# Patient Record
Sex: MALE | Race: WHITE | NOT HISPANIC OR LATINO | Employment: OTHER | URBAN - METROPOLITAN AREA
[De-identification: names, ages, dates, MRNs, and addresses within clinical notes are randomized per-mention and may not be internally consistent; named-entity substitution may affect disease eponyms.]

---

## 2023-10-12 NOTE — PROGRESS NOTES
Subjective   Patient ID: Tung Cook is a 78 y.o. male who presents for 6 month follow up sinus.  HPI    Review of Systems   HENT: Negative.       Objective   Physical Exam  Examination:    CONSTITUTIONAL: Alert, in no acute distress, normal pitch/clarity of voice, well-developed, well-nourished, cooperative.  HEAD/FACE: Normocephalic, atraumatic, no tenderness over the sinuses, facial strength and movement symmetric.    SKIN: Good turgor, no rashes, no suspicious lesions, in the head and neck.    EYES: Both eyes have normal extraocular movements with no nystagmus, pupils are equal and reactive to light and accommodation, conjunctiva is clear.    EARS: Both ears are negative for external skin abnormalities, external auditory canals are without lesions or signs of inflammation, tympanic membranes are intact and are of normal color and texture, no effusions are seen, light reflexes normal, no mastoid tenderness is noted to palpation, objective hearing is intact.    NOSE: No external skin lesions are noted, nares are patent, septum is intact and noninflamed, nasal turbinates are normal in appearance, sinuses are nontender to palpation bilaterally, no internal lesions or polyps are noted, no discharge is noted.    OROPHARYNX/ORAL CAVITY: Mucous membranes of the oropharynx and the oral cavity proper are without lesions or ulcerations, tongue mobility is normal and no lesions are noted, gingiva and alveolar mucosa is intact without lesions, oral mucosa is moist, muscular movement of the palate and gag reflex are normal.    NASOPHARYNX: Mucous membranes are noninflamed and no secretions or lesions are noted.    LARYNX: No mucosal inflammation or exudates are noted, arytenoids are normal in appearance and mobility, false vocal cords are without lesions as is the remainder of the supraglottic larynx, true vocal folds are mobile without inflammation or obstructions and no masses or lesions are noted in the  endolarynx.    NECK: No lymphadenopathy is palpated, neck is supple with full range of motion, thyroid is without swelling or tenderness, trachea is midline, no neck masses are noted.    Lymphatics: No cervical adenopathy or supraclavicular adenopathy noted to palpation.    HEART/VASCULAR: No jugular venous distention is noted, carotid pulsations are intact with a regular rate and rhythm noted,    PULMONARY: Good air movement with normal inspiratory/expiratory effort is noted, no audible wheezing is appreciated.    NEUROLOGIC: Alert and oriented, cranial nerves are grossly intact, gait is normal, sensation in the head and neck is intact,    PSYCH: oriented to person, place and time, normal mood and affect.    EXTREMITIES: No motor dysfunction of the upper and lower extremity is noted.     Assessment/Plan   {Assess/PlanSmartLinks:82317}

## 2023-10-16 ENCOUNTER — APPOINTMENT (OUTPATIENT)
Dept: OTOLARYNGOLOGY | Facility: CLINIC | Age: 78
End: 2023-10-16
Payer: MEDICARE

## 2023-11-03 PROBLEM — J34.89 SINUS MUCOSAL THICKENING: Status: ACTIVE | Noted: 2023-11-03

## 2023-11-03 PROBLEM — H52.13 MYOPIA, BILATERAL: Status: ACTIVE | Noted: 2023-04-24

## 2023-11-03 PROBLEM — G43.909 MIGRAINE: Status: ACTIVE | Noted: 2020-01-13

## 2023-11-03 PROBLEM — H25.13 NUCLEAR SENILE CATARACT OF BOTH EYES: Status: ACTIVE | Noted: 2019-07-10

## 2023-11-03 PROBLEM — J30.89 ENVIRONMENTAL AND SEASONAL ALLERGIES: Status: ACTIVE | Noted: 2019-07-10

## 2023-11-03 PROBLEM — Z86.79 HISTORY OF HYPERTENSION: Status: ACTIVE | Noted: 2023-11-03

## 2023-11-03 PROBLEM — H35.033 HYPERTENSIVE RETINOPATHY, BILATERAL: Status: ACTIVE | Noted: 2019-07-10

## 2023-11-03 PROBLEM — H43.813 POSTERIOR VITREOUS DETACHMENT OF BOTH EYES: Status: ACTIVE | Noted: 2019-07-10

## 2023-11-03 PROBLEM — J34.3 NASAL TURBINATE HYPERTROPHY: Status: ACTIVE | Noted: 2023-11-03

## 2023-11-03 PROBLEM — M19.90 ARTHRITIS: Chronic | Status: ACTIVE | Noted: 2019-09-18

## 2023-11-03 PROBLEM — K40.90 RIGHT INGUINAL HERNIA: Status: ACTIVE | Noted: 2018-02-22

## 2023-11-03 PROBLEM — I10 ESSENTIAL HYPERTENSION: Chronic | Status: ACTIVE | Noted: 2018-10-05

## 2023-11-03 PROBLEM — J34.9 DISORDER OF PARANASAL SINUS: Status: ACTIVE | Noted: 2023-11-03

## 2023-11-03 PROBLEM — J34.89 NASAL VESTIBULITIS: Status: ACTIVE | Noted: 2023-11-03

## 2023-11-03 PROBLEM — M35.9 DIFFUSE CONNECTIVE TISSUE DISEASE (MULTI): Status: ACTIVE | Noted: 2023-06-23

## 2023-11-03 PROBLEM — H93.293 ABNORMAL AUDITORY PERCEPTION OF BOTH EARS: Status: ACTIVE | Noted: 2023-11-03

## 2023-11-03 RX ORDER — PYRIDOXINE HCL (VITAMIN B6) 100 MG
250 TABLET ORAL
COMMUNITY

## 2023-11-03 RX ORDER — LOSARTAN POTASSIUM 50 MG/1
50 TABLET ORAL
COMMUNITY
Start: 2019-05-09

## 2023-11-03 RX ORDER — MULTIVIT-MIN/IRON/FOLIC ACID/K 18-600-40
CAPSULE ORAL
COMMUNITY
Start: 2023-01-23 | End: 2024-01-29 | Stop reason: ALTCHOICE

## 2023-11-03 RX ORDER — ACETAMINOPHEN 500 MG
500 TABLET ORAL
COMMUNITY

## 2023-11-03 RX ORDER — HYDROXYCHLOROQUINE SULFATE 200 MG/1
TABLET, FILM COATED ORAL
COMMUNITY
Start: 2019-03-12

## 2023-11-03 RX ORDER — CHOLECALCIFEROL (VITAMIN D3) 25 MCG
TABLET ORAL
COMMUNITY
Start: 2023-01-23

## 2023-11-03 RX ORDER — TRIAMCINOLONE ACETONIDE 55 UG/1
SPRAY, METERED NASAL
COMMUNITY
Start: 2019-09-11

## 2023-11-03 RX ORDER — MONTELUKAST SODIUM 10 MG/1
TABLET ORAL
COMMUNITY
Start: 2019-04-18 | End: 2024-01-29 | Stop reason: ALTCHOICE

## 2023-11-03 RX ORDER — ASPIRIN 325 MG
325 TABLET ORAL
COMMUNITY
End: 2024-01-29 | Stop reason: ALTCHOICE

## 2023-11-03 RX ORDER — LORATADINE AND PSEUDOEPHEDRINE SULFATE 5; 120 MG/1; MG/1
TABLET, EXTENDED RELEASE ORAL
COMMUNITY
Start: 2019-05-09 | End: 2024-01-29 | Stop reason: ALTCHOICE

## 2023-11-03 RX ORDER — TIZANIDINE 4 MG/1
4 TABLET ORAL EVERY 8 HOURS PRN
COMMUNITY
Start: 2021-03-08 | End: 2024-01-29 | Stop reason: ALTCHOICE

## 2023-11-03 RX ORDER — HYDROCHLOROTHIAZIDE 25 MG/1
TABLET ORAL
COMMUNITY
Start: 2019-05-09 | End: 2024-01-29 | Stop reason: ALTCHOICE

## 2023-11-03 RX ORDER — PANTOPRAZOLE SODIUM 40 MG/1
40 TABLET, DELAYED RELEASE ORAL
COMMUNITY
Start: 2023-06-23

## 2023-11-03 RX ORDER — CELECOXIB 200 MG/1
200 CAPSULE ORAL
COMMUNITY
Start: 2023-07-28

## 2023-11-05 NOTE — PROGRESS NOTES
Subjective   Patient ID: Tung Cook is a 78 y.o. male who presents for No chief complaint on file..  HPI  This 78-year-old male is being seen back in the office for recheck of his head and neck.  He does have a history of environmental and seasonal allergies as well as multiple food allergies.  Past exams have also shown evidence for nasal turbinate hypertrophy and he has had some past findings regarding sinus mucosal thickening on radiographic studies.  He was previously treated for nasal vestibulitis with mupirocin ointment.He was seen recently for left chest pain and he was sent for CT scan of the chest.  Which did reveal evidence for severe coronary artery atherosclerosis with calcifications.  No consolidation or significant pulmonary masses were noted.  Review of Systems    Objective   Physical Exam  EXAMINATION:    GENERAL DEBORA.EARANCE: Alert, in no acute distress, normal pitch and clarity of voice, well-developed and nourished, cooperative.    HEAD/FACE: Normocephalic, atraumatic, normal facial movements and strength, no no tenderness to palpation, no lesions noted.    SKIN: Normal turgor, no raised or ulcerative lesions, warm and dry to palpation.    EYES: Extraocular motions intact, no nystagmus noted, pupils equal and reactive to light and accommodation, no conjunctivitis.    EARS: Both ears--external ear anatomy is normal without lesions, auditory canals are patent and without skin abrasions or lesions, hearing is intact to voice, tympanic membranes are intact with no acute inflammation, light reflexes present, no effusions are noted and no mastoid tenderness found to palpation.    NOSE: No external skin lesions are noted, nares are patent, septum is intact, sinuses are nontender to palpation bilaterally, no intranasal lesions or inflammation is noted, nasal valve is normal.    OROPHARYNX/ORAL CAVITY: Oropharynx is not inflamed and is without lesions, mucosa of the oral cavity is intact and without  lesions, tongue is midline and mobile, no acute dental disease is noted, TMJs are mobile    LUNG-- NO wheezing or rhonchi normal respiratory effort    HEART-- No venous congestion,  rate and rhythm regular,    NECK: No lymphadenopathy is palpated, carotid pulses are intact, neck is supple with full range of motion, no thyroid abnormalities are noted, trachea is midline, no neck masses are palpated.    LYMPHATICS: No cervical adenopathy or supraclavicular adenopathy is palpated.    NEUROLOGIC/PSYCH; alert and oriented, cranial nerves are grossly intact, gait is without falling, no motor deficits are noted.  Assessment/Plan   {Assess/PlanSmartLinks:[id number]}

## 2023-11-08 ENCOUNTER — APPOINTMENT (OUTPATIENT)
Dept: OTOLARYNGOLOGY | Facility: CLINIC | Age: 78
End: 2023-11-08
Payer: MEDICARE

## 2023-12-30 NOTE — PROGRESS NOTES
Subjective   Patient ID: Tung Cook is a 78 y.o. male who presents for No chief complaint on file..  HPI  This 78-year-old is being seen back in the office for recheck primarily of his upper respiratory tract.  He does have a history of allergies and some structural changes intranasally.  There have been past problems with nasal vestibulitis and mupirocin ointment had been used as treatment for that skin irritation.  Past visits were negative for difficulties with breathing and he had been advised to use saline as a nasal rinse to counteract congestion and postnasal discharge.  He was also advised to stay on a topical nasal steroid spray.  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  Also been visual difficulties.   Review of Systems    Objective   Physical Exam  Examination:     CONSTITUTIONAL: Alert, in no acute distress, normal pitch/clarity of voice, well-developed, well-nourished, cooperative.  HEAD/FACE: Normocephalic, atraumatic, no tenderness over the sinuses, facial strength and movement symmetric.     SKIN: Good turgor, no rashes, no suspicious lesions, in the head and neck.     EYES: Both eyes have normal extraocular movements with no nystagmus, pupils are equal and reactive to light and accommodation, conjunctiva is clear.     EARS: Both ears are negative for external skin abnormalities, external auditory canals are without lesions or signs of inflammation, partially obstructive cerumen is removed from the right ear with a wax loop, tympanic membranes are intact and are of normal color and texture, no effusions are seen, light reflexes normal, no mastoid tenderness is noted to palpation, objective hearing is intact.     NOSE: No external skin lesions are noted, nares are patent, the previous crusting noted on the nasal vestibular area and anterior septum is resolved on the right and there was only some mild dryness noted on the left. No crusting or  signs of acute inflammation was noted.. No purulent discharge was noted. Septum is intact and noninflamed, nasal turbinates are normal in appearance, sinuses are nontender to palpation bilaterally, no internal lesions or polyps are noted, no discharge is noted.     OROPHARYNX/ORAL CAVITY: Mucous membranes of the oropharynx and the oral cavity proper are without lesions or ulcerations, tongue mobility is normal and no lesions are noted, gingiva and alveolar mucosa is intact without lesions, oral mucosa is moist, muscular movement of the palate and gag reflex are normal.     NECK: No lymphadenopathy is palpated, neck is supple with full range of motion, thyroid is without swelling or tenderness, trachea is midline, no neck masses are noted.     Lymphatics: No cervical adenopathy or supraclavicular adenopathy noted to palpation.     HEART/VASCULAR: No jugular venous distention is noted, carotid pulsations are intact with a regular rate and rhythm noted,     PULMONARY: Good air movement with normal inspiratory/expiratory effort is noted, no audible wheezing is appreciated.     NEUROLOGIC: Alert and oriented, cranial nerves are grossly intact, gait is normal, sensation in the head and neck is intact,     PSYCH: oriented to person, place and time, normal mood and affect.     EXTREMITIES: No motor dysfunction of the upper and lower extremity is noted.  Assessment/Plan   Problem List Items Addressed This Visit             ICD-10-CM    Diffuse connective tissue disease (CMS/HCC) M35.9    Environmental and seasonal allergies J30.89    Nasal turbinate hypertrophy - Primary J34.3            Derrell James DMD, MD 12/30/23 1:51 PM

## 2024-01-02 ENCOUNTER — APPOINTMENT (OUTPATIENT)
Dept: OTOLARYNGOLOGY | Facility: CLINIC | Age: 79
End: 2024-01-02
Payer: MEDICARE

## 2024-01-17 RX ORDER — SULFASALAZINE 500 MG/1
TABLET, DELAYED RELEASE ORAL
COMMUNITY
Start: 2023-06-28 | End: 2024-01-29 | Stop reason: ALTCHOICE

## 2024-01-17 RX ORDER — MUPIROCIN 20 MG/G
OINTMENT TOPICAL
COMMUNITY
Start: 2023-01-23 | End: 2024-01-29 | Stop reason: ALTCHOICE

## 2024-01-25 NOTE — PROGRESS NOTES
Objective   Physical Exam  .Subjective   Patient ID: Tung Cook is a 78 y.o. male who presents for Follow-up on nasal congestion and rhinitis primarily  HPI  This 78-year-old is being seen back in the office for recheck primarily of his upper respiratory tract.  He was last seen in March 2023.  At that time an upper endoscopy was done showing no obstruction nasally or signs of infection.  He does have a history of allergies and some structural changes intranasally.  There have been past problems with nasal vestibulitis and mupirocin ointment had been used as treatment for that skin irritation.  Past visits were negative for difficulties with breathing and he had been advised to use saline as a nasal rinse to counteract congestion and postnasal discharge.  He was also advised to stay on a topical nasal steroid spray.  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  Also been visual difficulties.   Review of Systems  A 12 point ROS has been reviewed and are negative for complaint except what is stated in the history of present illness and/or past medical history as noted in the EMR  Objective   Physical Exam  Examination:     CONSTITUTIONAL: Alert, in no acute distress, normal pitch/clarity of voice, well-developed, well-nourished, cooperative.  HEAD/FACE: Normocephalic, atraumatic, no tenderness over the sinuses, facial strength and movement symmetric.     SKIN: Good turgor, no rashes, no suspicious lesions, in the head and neck.     EYES: Both eyes have normal extraocular movements with no nystagmus, pupils are equal and reactive to light and accommodation, conjunctiva is clear.     EARS: Both ears are negative for external skin abnormalities, external auditory canals are without lesions or signs of inflammation, partially obstructive cerumen is removed from the right ear with a wax loop, tympanic membranes are intact and are of normal color and texture, no  effusions are seen, light reflexes normal, no mastoid tenderness is noted to palpation, objective hearing is intact.     NOSE: No external skin lesions are noted, nares are patent, the previous crusting noted on the nasal vestibular area and anterior septum is resolved on the right and there was only some mild dryness noted on the left anterior inferior turbinate.  No crusting or signs of acute inflammation was noted.. No purulent discharge was noted. Septum is intact and noninflamed, nasal turbinates are normal in appearance, sinuses are nontender to palpation bilaterally, no internal lesions or polyps are noted, no discharge is noted.     OROPHARYNX/ORAL CAVITY: Mucous membranes of the oropharynx and the oral cavity proper are without lesions or ulcerations, tongue mobility is normal and no lesions are noted, gingiva and alveolar mucosa is intact without lesions, oral mucosa is moist, muscular movement of the palate and gag reflex are normal.     NECK: No lymphadenopathy is palpated, neck is supple with full range of motion, thyroid is without swelling or tenderness, trachea is midline, no neck masses are noted.     Lymphatics: No cervical adenopathy or supraclavicular adenopathy noted to palpation.     HEART/VASCULAR: No jugular venous distention is noted, carotid pulsations are intact with a regular rate and rhythm noted,     PULMONARY: Good air movement with normal inspiratory/expiratory effort is noted, no audible wheezing is appreciated.     NEUROLOGIC: Alert and oriented, cranial nerves are grossly intact, gait is normal, sensation in the head and neck is intact,     PSYCH: oriented to person, place and time, normal mood and affect.     EXTREMITIES: No motor dysfunction of the upper and lower extremity is noted    Patient ID: Tung Cook is a 78 y.o. male.    Ear cerumen removal    Date/Time: 1/29/2024 1:51 PM    Performed by: Derrell James DMD, MD  Authorized by: Derrell James DMD, MD     Consent:     Consent obtained:  Verbal    Consent given by:  Patient    Risks discussed:  Pain and incomplete removal    Alternatives discussed:  No treatment and alternative treatment  Universal protocol:     Procedure explained and questions answered to patient or proxy's satisfaction: yes      Imaging studies available: no      Required blood products, implants, devices, and special equipment available: no      Patient identity confirmed:  Verbally with patient  Procedure details:     Location:  L ear and R ear    Procedure type: curette      Procedure type comment:  Or suction    Procedure outcomes: cerumen removed    Post-procedure details:     Inspection:  No bleeding and ear canal clear    Hearing quality:  Improved    Procedure completion:  Tolerated well, no immediate complications  Comments:        Procedure Ear Cleaning    Consent:  The planned procedure including the risks as well as alternatives of treatments were discussed and verbal consent obtained.    Procedure: Using otoscopic techniques, cerumen is removed with a wax loop from both ear canals.    Findings:  The external auditory canals are without inflammation or lesions and both tympanic members are normal in appearance with no evidence for middle ear effusion or signs of infection. No mastoid tenderness is noted. The patient tolerated the procedure well.    Assessment/Plan   Problem List Items Addressed This Visit             ICD-10-CM    Diffuse connective tissue disease (CMS/HCC) M35.9    Environmental and seasonal allergies J30.89    Nasal turbinate hypertrophy J34.3    Sinus mucosal thickening - Primary J34.89     Other Visit Diagnoses         Codes    Impacted cerumen of right ear     H61.21          I discussed the clinical finds with the patient.  I reminded him he should try to use the nasal saline washes more frequently even when he is not feeling obstructed nasally.  This will help mucous membrane health and often decrease a sense of  congestion and drainage.  If necessary topical nasal steroid sprays can be used especially if his seasonal allergies become a problem for him.  That along with an antihistamine of choice would be helpful.  Nasal lubrication using saline gels and sprays was discussed as well during the day to help minimize dryness.  You of his 2019 hearing test he had upper normal hearing through many frequencies with low normal hearing in the high frequency area.  If he senses any tinnitus or difficulties with hearing function he should have that reassessed.       Derrell James DMD, MD 01/29/24 1:51 PM

## 2024-01-29 ENCOUNTER — OFFICE VISIT (OUTPATIENT)
Dept: OTOLARYNGOLOGY | Facility: CLINIC | Age: 79
End: 2024-01-29
Payer: MEDICARE

## 2024-01-29 VITALS — HEIGHT: 71 IN | WEIGHT: 188 LBS | BODY MASS INDEX: 26.32 KG/M2

## 2024-01-29 DIAGNOSIS — M35.9 DIFFUSE CONNECTIVE TISSUE DISEASE (MULTI): ICD-10-CM

## 2024-01-29 DIAGNOSIS — J34.3 NASAL TURBINATE HYPERTROPHY: ICD-10-CM

## 2024-01-29 DIAGNOSIS — J34.89 SINUS MUCOSAL THICKENING: Primary | ICD-10-CM

## 2024-01-29 DIAGNOSIS — H61.21 IMPACTED CERUMEN OF RIGHT EAR: ICD-10-CM

## 2024-01-29 DIAGNOSIS — J30.89 ENVIRONMENTAL AND SEASONAL ALLERGIES: ICD-10-CM

## 2024-01-29 PROCEDURE — 1036F TOBACCO NON-USER: CPT | Performed by: OTOLARYNGOLOGY

## 2024-01-29 PROCEDURE — 99213 OFFICE O/P EST LOW 20 MIN: CPT | Performed by: OTOLARYNGOLOGY

## 2024-01-29 PROCEDURE — 1160F RVW MEDS BY RX/DR IN RCRD: CPT | Performed by: OTOLARYNGOLOGY

## 2024-01-29 PROCEDURE — 1159F MED LIST DOCD IN RCRD: CPT | Performed by: OTOLARYNGOLOGY

## 2024-01-29 PROCEDURE — 69210 REMOVE IMPACTED EAR WAX UNI: CPT | Performed by: OTOLARYNGOLOGY

## 2024-01-29 RX ORDER — AMITRIPTYLINE HYDROCHLORIDE 10 MG/1
1 TABLET, FILM COATED ORAL NIGHTLY
COMMUNITY
Start: 2024-01-11 | End: 2024-01-29 | Stop reason: ALTCHOICE

## 2024-08-26 ENCOUNTER — APPOINTMENT (OUTPATIENT)
Dept: OTOLARYNGOLOGY | Facility: CLINIC | Age: 79
End: 2024-08-26
Payer: MEDICARE